# Patient Record
(demographics unavailable — no encounter records)

---

## 2025-03-11 NOTE — HISTORY OF PRESENT ILLNESS
[de-identified] : In office for follow-up. Concerned about frequent respiratory infections in No significant nasal symptoms currently. Gets skin rashes especially after eating bread, although he tries to eat healthiest type.  The winter.  Works as a teacher.  Had COVID-19, influenza in the bad respiratory infection back-to-back, in spite of being vaccinated.  Takes vitamin D3 5000 units daily and the blood work always shows low vitamin D.  Had antibiotic for diverticulitis.  Scheduled for colonoscopy in several weeks.  No upper endoscopy.  Cardiologist told him a few years ago that he has leaky gut syndrome. When going to [place], regardless of the country, he feels better, tolerates coffee and all the skin rash is clear.  Rash on the scalp cleared after his tour scratch that rash on the scalp cleared after she stopped drinking did she not that contains carrot, chicory, and [place] seed.  It also contains barley.  Has on and off abdominal pain. Asthma: Ran out of Arnuity about 3 months ago.  Gets occasional shortness of breath with exertion.  Uses levalbuterol HFA daily, several times a day during acute illnesses but very little otherwise.  Blood work in the past showed elevated total IgE but IgE to specific allergens was negative. Had cardiac ablation in June 2024 and at that time LVEF was 55%. Blood work in April 2024 showed normal immunoglobulins but low pneumococcal titers.  Prevnar 20 was prescribed at the time.  History of coronary artery disease with coronary bypass, 3 stents placed in June 2022.  Initial heart attack was after pneumonia and he developed heart failure.  He had a pacemaker installed as well.  He had spirometry with moderate restriction.  He received Prevnar 13 in 2021, Pneumovax in 2005 and 2013. He does not want blood products due to Islam beliefs. A exercise test performed 9 years ago showed low oxygen extraction and low stroke-volume.  After exercise, spirometry improved with bronchodilator.  Dyspnea with exertion was attributed to cardiac causes.

## 2025-03-11 NOTE — SOCIAL HISTORY
[de-identified] : House with no carpet in the bedroom, 1 dog, has air purifier but not using it, split units for air conditioning, would insert for heating along with oil heat.  When he was sick he slept in the living room that has carpet.

## 2025-03-11 NOTE — PHYSICAL EXAM
[Alert] : alert [No Acute Distress] : no acute distress [Normal Voice/Communication] : normal voice communication [Supple] : the neck was supple [Normal S1, S2] : normal S1 and S2 [Regular Rhythm] : with a regular rhythm [Normal Cervical Lymph Nodes] : cervical [Skin Intact] : skin intact  [Patches] : no patches [No clubbing] : no clubbing [No Cyanosis] : no cyanosis [Alert, Awake, Oriented as Age-Appropriate] : alert, awake, oriented as age appropriate [de-identified] : Eyes clear. [de-identified] : Throat clear.  Nasal mucosa pink, mild stuffiness on the right, scant clear discharge.  Tympanic membrane normal on the right and some wax on the left.  No sinus tenderness. [de-identified] : Chest clear, slight decreased breath sounds diffusely, no wheezing or crackles.

## 2025-03-11 NOTE — ASSESSMENT
[FreeTextEntry1] : Recurrent respiratory infections/screen immunodeficiency: Blood work for immune workup.  Advised to take vitamin D with food that contains fat.  Blood work for other vitamins, IBD panel, celiac panel.  It was ordered to assess for possible source of malabsorption. Asthma mild persistent: Spirometry with restriction but stable.  Exhaled nitric oxide slightly elevated.  Renewed Arnuity 100, 1 puff daily.  Use levalbuterol when sick. Skin rashes: Try gluten-free diet for 3 months and see if skin improves. Follow-up in 6 months.

## 2025-03-11 NOTE — REASON FOR VISIT
[Routine Follow-Up] : a routine follow-up visit for [Asthma] : asthma [Recurrent Infect] : recurrent infections

## 2025-03-11 NOTE — IMPRESSION
[Spirometry] : Spirometry [Moderate] : (moderate) [Fractional of Exhaled Nitric Oxide ___] : Fractional of Exhaled Nitric Oxide [unfilled] [Intermediate] : intermediate [FreeTextEntry1] : Spirometry with moderate to severe restriction, at baseline.